# Patient Record
Sex: FEMALE | Race: BLACK OR AFRICAN AMERICAN | NOT HISPANIC OR LATINO | URBAN - METROPOLITAN AREA
[De-identification: names, ages, dates, MRNs, and addresses within clinical notes are randomized per-mention and may not be internally consistent; named-entity substitution may affect disease eponyms.]

---

## 2020-06-11 ENCOUNTER — EMERGENCY (EMERGENCY)
Facility: HOSPITAL | Age: 55
LOS: 1 days | Discharge: HOME | End: 2020-06-11
Attending: EMERGENCY MEDICINE | Admitting: EMERGENCY MEDICINE
Payer: MEDICARE

## 2020-06-11 VITALS
RESPIRATION RATE: 18 BRPM | SYSTOLIC BLOOD PRESSURE: 172 MMHG | DIASTOLIC BLOOD PRESSURE: 91 MMHG | OXYGEN SATURATION: 97 % | WEIGHT: 149.91 LBS | TEMPERATURE: 99 F | HEART RATE: 115 BPM

## 2020-06-11 VITALS
RESPIRATION RATE: 18 BRPM | DIASTOLIC BLOOD PRESSURE: 74 MMHG | HEART RATE: 86 BPM | OXYGEN SATURATION: 97 % | TEMPERATURE: 98 F | SYSTOLIC BLOOD PRESSURE: 190 MMHG

## 2020-06-11 LAB — SARS-COV-2 RNA SPEC QL NAA+PROBE: SIGNIFICANT CHANGE UP

## 2020-06-11 PROCEDURE — 87635 SARS-COV-2 COVID-19 AMP PRB: CPT

## 2020-06-11 PROCEDURE — 73630 X-RAY EXAM OF FOOT: CPT

## 2020-06-11 PROCEDURE — 99284 EMERGENCY DEPT VISIT MOD MDM: CPT

## 2020-06-11 PROCEDURE — 73630 X-RAY EXAM OF FOOT: CPT | Mod: 26,LT

## 2020-06-11 PROCEDURE — 99283 EMERGENCY DEPT VISIT LOW MDM: CPT

## 2020-06-11 RX ORDER — LIDOCAINE 4 G/100G
1 CREAM TOPICAL ONCE
Refills: 0 | Status: COMPLETED | OUTPATIENT
Start: 2020-06-11 | End: 2020-06-11

## 2020-06-11 RX ADMIN — LIDOCAINE 1 PATCH: 4 CREAM TOPICAL at 18:27

## 2020-06-11 NOTE — ED PROVIDER NOTE - NSFOLLOWUPINSTRUCTIONS_ED_ALL_ED_FT
You were evaluated in the ED for foot pain. You had an xray of the foot which revealed no acute injuries. You were offered a walker, transportation to a shelter, and shelter placement, all which you declined. You refused blood pressure treatment. You had a COVID test, negative for the infection. You were fed.     Please follow the below diet for high blood pressure.    DASH Eating Plan    WHAT YOU NEED TO KNOW:    The DASH (Dietary Approaches to Stop Hypertension) Eating Plan is designed to help prevent or lower high blood pressure. It can also help to lower LDL (bad) cholesterol and decrease your risk of heart disease. The plan is low in sodium, sugar, unhealthy fats, and total fat. It is high in potassium, calcium, magnesium, and fiber. These nutrients are added when you eat more fruits, vegetables, and whole grains.          DISCHARGE INSTRUCTIONS:    Your sodium limit each day: Your dietitian will tell you how much sodium is safe for you to have each day. People with high blood pressure should have no more than 1,500 to 2,300 mg of sodium in a day. A teaspoon (tsp) of salt has 2,300 mg of sodium. This may seem like a difficult goal, but small changes to the foods you eat can make a big difference. Your healthcare provider or dietitian can help you create a meal plan that follows your sodium limit.    How to limit sodium:     Read food labels. Food labels can help you choose foods that are low in sodium. The amount of sodium is listed in milligrams (mg). The % Daily Value (DV) column tells you how much of your daily needs are met by 1 serving of the food for each nutrient listed. Choose foods that have less than 5% of the DV of sodium. These foods are considered low in sodium. Foods that have 20% or more of the DV of sodium are considered high in sodium. Avoid foods that have more than 300 mg of sodium in each serving. Choose foods that say low-sodium, reduced-sodium, or no salt added on the food label.           Avoid salt. Do not salt food at the table, and add very little salt to foods during cooking. Use herbs and spices, such as onions, garlic, and salt-free seasonings to add flavor to foods. Try lemon or lime juice or vinegar to give foods a tart flavor. Use hot peppers or a small amount of hot pepper sauce to add a spicy flavor to foods.       Ask about salt substitutes. Ask your healthcare provider if you may use salt substitutes. Some salt substitutes have ingredients that can be harmful if you have certain health conditions.      Choose foods carefully at restaurants. Meals from restaurants, especially fast food restaurants, are often high in sodium. Some restaurants have nutrition information that tells you the amount of sodium in their foods. Ask to have your food prepared with less, or no salt.     What you need to know about fats:     Include healthy fats. Examples are unsaturated fats and omega-3 fatty acids. Unsaturated fats are found in soybean, canola, olive, or sunflower oil, and liquid and soft tub margarines. Omega-3 fatty acids are found in fatty fish, such as salmon, tuna, mackerel, and sardines. It is also found in flaxseed oil and ground flaxseed. Sources of Omega 3           Avoid unhealthy fats. Do not eat unhealthy fats, such as saturated fats and trans fats. Saturated fats are found in foods that contain fat from animals. Examples are fatty meats, whole milk, butter, cream, and other dairy foods. It is also found in shortening, stick margarine, palm oil, and coconut oil. Trans fats are found in fried foods, crackers, chips, and baked goods made with margarine or shortening.     Foods to include: With the DASH eating plan, you need to eat a certain number of servings from each food group. This will help you get enough of certain nutrients and limit others. The amount of servings you should eat depends on how many calories you need. Your dietitian can tell you how many calories you need. The number of servings listed next to the food groups below are for people who need about 2,000 calories each day.     Grains: 6 to 8 servings (3 of these servings should be whole-grain foods)  1 slice of whole-grain bread       1 ounce of dry cereal      ½ cup of cooked cereal, pasta, or brown rice      Vegetables and fruits: 4 to 5 servings of fruits and 4 to 5 servings of vegetables  1 medium fruit      ½ cup of frozen, canned (no added salt), or chopped fresh vegetables       ½ cup of fresh, frozen, dried, or canned fruit (canned in light syrup or fruit juice)      ½ cup of vegetable or fruit juice      Dairy: 2 to 3 servings  1 cup of nonfat (skim) or 1% milk      1½ ounces of fat-free or low-fat cheese      6 ounces of nonfat or low-fat yogurt      Lean meat, poultry, and fish: 6 ounces or less  Poultry (chicken, turkey) with no skin      Fish (especially fatty fish, such as salmon, fresh tuna, or mackerel)      Lean beef and pork (loin, round, extra lean hamburger)      Egg whites and egg substitutes      Nuts, seeds, and legumes: 4 to 5 servings each week  ½ cup of cooked beans and peas      1½ ounces of unsalted nuts      2 tablespoons of peanut butter or seeds      Sweets and added sugars: 5 or less each week  1 tablespoon of sugar, jelly, or jam      ½ cup of sorbet or gelatin      1 cup of lemonade      Fats: 2 to 3 servings each week  1 teaspoon of soft margarine or vegetable oil      1 tablespoon of mayonnaise      2 tablespoons of salad dressing    Foods to avoid:     Grains:   Baked goods, such as doughnuts, pastries, cookies, and biscuits (high in fat and sugar)      Mixes for cornbread and biscuits, packaged foods, such as bread stuffing, rice and pasta mixes, macaroni and cheese, and instant cereals (high in sodium)      Fruits and vegetables:   Regular, canned vegetables (high in sodium)      Sauerkraut, pickled vegetables, and other foods prepared in brine (high in sodium)      Fried vegetables or vegetables in butter or high-fat sauces      Fruit in cream or butter sauce (high in fat)      Dairy:   Whole milk, 2% milk, and cream (high in fat)      Regular cheese and processed cheese (high in fat and sodium)      Meats and protein foods:   Smoked or cured meat, such as corned beef, valdez, ham, hot dogs, and sausage (high in fat and sodium)      Canned beans and canned meats or spreads, such as potted meats, sardines, anchovies, and imitation seafood (high in sodium)      Deli or lunch meats, such as bologna, ham, turkey, and roast beef (high in sodium)      High-fat meat (T-bone steak, regular hamburger, and ribs)      Whole eggs and egg yolks (high in fat)      Other:   Seasonings made with salt, such as garlic salt, celery salt, onion salt, seasoned salt, meat tenderizers, and monosodium glutamate (MSG)      Miso soup and canned or dried soup mixes (high in sodium)      Regular soy sauce, barbecue sauce, teriyaki sauce, steak sauce, Worcestershire sauce, and most flavored vinegars (high in sodium)      Regular condiments, such as mustard, ketchup, and salad dressings (high in sodium)      Gravy and sauces, such as Luis Manuel or cheese sauces (high in sodium and fat)      Drinks high in sugar, such as soda or fruit drinks      Snack foods, such as salted chips, popcorn, pretzels, pork rinds, salted crackers, and salted nuts      Frozen foods, such as dinners, entrees, vegetables with sauces, and breaded meats (high in sodium)    Other guidelines to follow:     Maintain a healthy weight. Your risk for heart disease is higher if you are overweight. Your healthcare provider may suggest that you lose weight if you are overweight. You can lose weight by eating fewer calories and foods that have added sugars and fat. The DASH meal plan can help you do this. Decrease calories by eating smaller portions at each meal and fewer snacks. Ask your healthcare provider for more information about how to lose weight.       Exercise regularly. Regular exercise can help you reach or maintain a healthy weight. Regular exercise can also help decrease your blood pressure and improve your cholesterol levels. Get 30 minutes or more of moderate exercise each day of the week. To lose weight, get at least 60 minutes of exercise. Talk to your healthcare provider about the best exercise program for you.Walking for Exercise           Limit alcohol. Women should limit alcohol to 1 drink a day. Men should limit alcohol to 2 drinks a day. A drink of alcohol is 12 ounces of beer, 5 ounces of wine, or 1½ ounces of liquor.      Hypertension    Hypertension, commonly called high blood pressure, is when the force of blood pumping through your arteries is too strong. Hypertension forces your heart to work harder to pump blood. Your arteries may become narrow or stiff. Having untreated or uncontrolled hypertension for a long period of time can cause heart attack, stroke, kidney disease, and other problems. If started on a medication, take exactly as prescribed by your health care professional. Maintain a healthy lifestyle and follow up with your primary care physician.    SEEK IMMEDIATE MEDICAL CARE IF YOU HAVE ANY OF THE FOLLOWING SYMPTOMS: severe headache, confusion, chest pain, abdominal pain, vomiting, or shortness of breath.
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
## 2020-06-11 NOTE — ED PROVIDER NOTE - CLINICAL SUMMARY MEDICAL DECISION MAKING FREE TEXT BOX
Pt c/o atraumatic left pain, looking for food / drink / shelter placement. Poor historian w/ inconsistent story. Will attempt to get collateral info from [facility]. Feed / XR/ analgesia, SW for placement. Anticipate d/c

## 2020-06-11 NOTE — ED PROVIDER NOTE - PATIENT PORTAL LINK FT
You can access the FollowMyHealth Patient Portal offered by [facility] by registering at the following website: [email or web address]. By joining [facility]’s FollowMyHealth portal, you will also be able to view your health information using other applications (apps) compatible with our system.

## 2020-06-11 NOTE — ED PROVIDER NOTE - CARE PLAN
Principal Discharge DX:	Foot pain, left Principal Discharge DX:	Foot pain, left  Secondary Diagnosis:	Social problem  Secondary Diagnosis:	Hypertension, unspecified type

## 2020-06-11 NOTE — ED ADULT NURSE NOTE - OBJECTIVE STATEMENT
Pt presents reporting left foot pain. Pt reports distant hx of left foot fracture that never properly healed. PT reports she did excessive walking yesterday and now has pain and subjective edema to the lateral edge of her left foot. Pt currently homeless, requests assistance with shelter placement.

## 2020-06-11 NOTE — ED ADULT NURSE NOTE - CHPI ED NUR SYMPTOMS NEG
no fever/no abrasion/no deformity/no weakness/no tingling/no difficulty bearing weight/no numbness/no stiffness/no bruising

## 2020-06-11 NOTE — ED PROVIDER NOTE - PHYSICAL EXAMINATION
Limited PE performed in the setting of the COVID10 pandemic, in efforts to limit exposure and cross-contamination  Constitutional: Well appearing, well nourished, awake, alert, oriented to person, place, time/situation and in no apparent distress.  ENMT: Airway patent.   Eyes: Clear bilaterally  Cardiac: Normal rate, regular rhythm.   Respiratory: No increased WOB, tachypnea, hypoxia, or accessory mm use. Pt speaks in full sentences.   Gastrointestinal: Abd soft, NT, ND. No guarding, rebound, or rigidity. obese  Musculoskeletal: Range of motion is not limited. mild symmetric nonpitting b/l LE edema. no calf ttp. no apparent foot trauma. + mild ttp over the L 5th metatarsal. NVI b/l  Neuro: Alert and oriented x 3, face symmetric and speech fluent. Nml gross motor movement, grossly non focal   Skin: Skin normal color for race, warm, dry and intact. No evidence of rash.  Psych: Alert and oriented to person, place, time/situation. normal mood and affect. no apparent risk to self or others.
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
## 2020-06-15 DIAGNOSIS — Z91.013 ALLERGY TO SEAFOOD: ICD-10-CM

## 2020-06-15 DIAGNOSIS — M79.672 PAIN IN LEFT FOOT: ICD-10-CM

## 2020-06-15 DIAGNOSIS — I10 ESSENTIAL (PRIMARY) HYPERTENSION: ICD-10-CM

## 2020-06-15 DIAGNOSIS — R60.0 LOCALIZED EDEMA: ICD-10-CM

## 2020-06-15 DIAGNOSIS — Z91.018 ALLERGY TO OTHER FOODS: ICD-10-CM

## 2020-06-15 DIAGNOSIS — Z20.828 CONTACT WITH AND (SUSPECTED) EXPOSURE TO OTHER VIRAL COMMUNICABLE DISEASES: ICD-10-CM

## 2020-07-08 ENCOUNTER — EMERGENCY (EMERGENCY)
Facility: HOSPITAL | Age: 55
LOS: 1 days | Discharge: HOME | End: 2020-07-08
Attending: EMERGENCY MEDICINE | Admitting: EMERGENCY MEDICINE
Payer: MEDICARE

## 2020-07-08 VITALS
DIASTOLIC BLOOD PRESSURE: 97 MMHG | RESPIRATION RATE: 18 BRPM | SYSTOLIC BLOOD PRESSURE: 168 MMHG | HEIGHT: 66 IN | WEIGHT: 119.93 LBS | OXYGEN SATURATION: 99 % | TEMPERATURE: 98 F | HEART RATE: 81 BPM

## 2020-07-08 PROCEDURE — 99282 EMERGENCY DEPT VISIT SF MDM: CPT

## 2020-07-08 RX ORDER — BACITRACIN ZINC 500 UNIT/G
1 OINTMENT IN PACKET (EA) TOPICAL ONCE
Refills: 0 | Status: COMPLETED | OUTPATIENT
Start: 2020-07-08 | End: 2020-07-08

## 2020-07-08 RX ADMIN — Medication 1 APPLICATION(S): at 09:41
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
## 2020-07-08 NOTE — ED PROVIDER NOTE - NS_EDPROVIDERDISPOUSERTYPE_ED_A_ED
I have personally evaluated and examined the patient. The Attending was available to me as a supervising provider if needed. Scribe Attestation (For Scribes USE Only)... Attending Attestation (For Attendings USE Only).../Scribe Attestation (For Scribes USE Only)...
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
## 2020-07-08 NOTE — ED ADULT NURSE NOTE - CHPI ED NUR SYMPTOMS NEG
no bleeding/no purulent drainage/no redness/no inflammation/no fever/no bleeding at site/no drainage/no rectal pain/no chills
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
## 2020-07-08 NOTE — ED PROVIDER NOTE - NSFOLLOWUPINSTRUCTIONS_ED_ALL_ED_FT
Keep your wound dry, clean, apply topical bacitracin as recommended and f/u for re-check in a few days      Blisters, Adult     A blister is a raised bubble of skin filled with liquid. Blisters often develop in an area of the skin that repeatedly rubs or presses against another surface (friction blister). Friction blisters can occur on any part of the body, but they usually develop on the hands or feet. Long-term pressure on the same area of the skin can also lead to areas of hardened skin (calluses).  What are the causes?  A blister can be caused by:  An injury.A burn.An allergic reaction.An infection.Exposure to irritating chemicals.Friction, especially in an area with a lot of heat and moisture.Friction blisters often result from:  Sports.Repetitive activities.Using tools and doing other activities without wearing gloves.Shoes that are too tight or too loose.What are the signs or symptoms?  A blister is often round and looks like a bump. It may:  Itch.Be painful to the touch.Before a blister forms, the skin may:  Become red.Feel warm.Itch.Be painful to the touch.How is this diagnosed?  A blister is diagnosed with a physical exam.  How is this treated?  Treatment usually involves protecting the area where the blister has formed until the skin has healed. Other treatments may include:  A bandage (dressing) to cover the blister.Extra padding around and over the blister, so that it does not rub on anything.Antibiotic ointment.Most blisters break open, dry up, and go away on their own within 1–2 weeks. Blisters that are very painful may be drained before they break open on their own. If the blister is large or painful, it can be drained by:  Sterilizing a small needle with rubbing alcohol.  Washing your hands with soap and water.  Inserting the needle in the edge of the blister to make a small hole. Some fluid will drain out of the hole. Let the top or roof of the blister stay in place. This helps the skin heal.  Washing the blister with mild soap and water.  Covering the blister with antibiotic ointment, if prescribed by your health care provider, and a dressing.  Some blisters may need to be drained by a health care provider.  Follow these instructions at home:  Protect the area where the blister has formed as told by your health care provider.Keep your blister clean and dry. This helps to prevent infection.Do not pop your blister. This can cause infection.If you were prescribed an antibiotic, use it as told by your health care provider. Do not stop using the antibiotic even if your condition improves.Wear different shoes until the blister heals.Avoid the activity that caused the blister until your blister heals.Check your blister every day for signs of infection. Check for:  More redness, swelling, or pain.More fluid or blood.Warmth.Pus or a bad smell.The blister getting better and then getting worse.How is this prevented?  Taking these steps can help to prevent blisters that are caused by friction. Make sure you:  Wear comfortable shoes that fit well.Always wear socks with shoes.Wear extra socks or use tape, bandages, or pads over blister-prone areas as needed. You may also apply petroleum jelly under bandages in blister-prone areas.Wear protective gear, such as gloves, when participating in sports or activities that can cause blisters.Wear loose-fitting, moisture-wicking clothes when participating in sports or activities.Use powders as needed to keep your feet dry.Contact a health care provider if:  You have more redness, swelling, or pain around your blister.You have more fluid or blood coming from your blister.Your blister feels warm to the touch.You have pus or a bad smell coming from your blister.You have a fever or chills.Your blister gets better and then it gets worse.This information is not intended to replace advice given to you by your health care provider. Make sure you discuss any questions you have with your health care provider.

## 2020-07-08 NOTE — ED PROVIDER NOTE - SKIN, MLM
Skin normal color for race, warm, dry and intact. [id number] circular wound. Not cellulitic appearing. No active drainage no erythema. Skin normal color for race, warm, dry and intact. [id number] circular superficial wound, Not cellulitic appearing. No active drainage no erythema. pink granulation tissue present

## 2020-07-08 NOTE — ED PROVIDER NOTE - PATIENT PORTAL LINK FT
You can access the FollowMyHealth Patient Portal offered by [facility] by registering at the following website: [email or web address]. By joining [employer] [employer]’s FollowMyHealth portal, you will also be able to view your health information using other applications (apps) compatible with our system.

## 2020-07-08 NOTE — ED PROVIDER NOTE - MUSCULOSKELETAL, MLM
Spine appears normal, range of motion is not limited, no muscle or joint tenderness Spine and all extremities grossly appears normal, range of motion is not limited, no muscle or joint tenderness

## 2020-07-08 NOTE — ED ADULT NURSE NOTE - NSIMPLEMENTINTERV_GEN_ALL_ED
Implemented All Fall Risk Interventions:  [place] to call system. Call bell, personal items and telephone within reach. Instruct patient to call for assistance. Room bathroom lighting operational. Non-slip footwear when patient is off stretcher. Physically safe environment: no spills, clutter or unnecessary equipment. Stretcher in lowest position, wheels locked, appropriate side rails in place. Provide visual cue, wrist band, yellow gown, etc. Monitor gait and stability. Monitor for mental status changes and reorient to person, place, and time. Review medications for side effects contributing to fall risk. Reinforce activity limits and safety measures with patient and family.

## 2020-07-12 DIAGNOSIS — Z48.00 ENCOUNTER FOR CHANGE OR REMOVAL OF NONSURGICAL WOUND DRESSING: ICD-10-CM

## 2020-07-12 DIAGNOSIS — Z91.018 ALLERGY TO OTHER FOODS: ICD-10-CM

## 2020-07-12 DIAGNOSIS — Z91.013 ALLERGY TO SEAFOOD: ICD-10-CM

## 2023-03-23 ENCOUNTER — EMERGENCY (EMERGENCY)
Facility: HOSPITAL | Age: 58
LOS: 1 days | Discharge: HOME | End: 2023-03-23
Attending: EMERGENCY MEDICINE | Admitting: EMERGENCY MEDICINE
Payer: SELF-PAY

## 2023-03-23 VITALS
OXYGEN SATURATION: 98 % | DIASTOLIC BLOOD PRESSURE: 89 MMHG | HEART RATE: 94 BPM | TEMPERATURE: 99 F | RESPIRATION RATE: 18 BRPM | SYSTOLIC BLOOD PRESSURE: 149 MMHG

## 2023-03-23 VITALS
HEART RATE: 97 BPM | TEMPERATURE: 99 F | SYSTOLIC BLOOD PRESSURE: 169 MMHG | DIASTOLIC BLOOD PRESSURE: 95 MMHG | OXYGEN SATURATION: 95 % | RESPIRATION RATE: 18 BRPM

## 2023-03-23 PROBLEM — Z78.9 OTHER SPECIFIED HEALTH STATUS: Chronic | Status: ACTIVE | Noted: 2020-07-09

## 2023-03-23 LAB
ALBUMIN SERPL ELPH-MCNC: 3.7 G/DL — SIGNIFICANT CHANGE UP (ref 3.3–5)
ALP SERPL-CCNC: 115 U/L — SIGNIFICANT CHANGE UP (ref 40–120)
ALT FLD-CCNC: 23 U/L — SIGNIFICANT CHANGE UP (ref 10–45)
ANION GAP SERPL CALC-SCNC: 10 MMOL/L — SIGNIFICANT CHANGE UP (ref 5–17)
APTT BLD: 30.6 SEC — SIGNIFICANT CHANGE UP (ref 27.5–35.5)
AST SERPL-CCNC: 17 U/L — SIGNIFICANT CHANGE UP (ref 10–40)
BASOPHILS # BLD AUTO: 0.03 K/UL — SIGNIFICANT CHANGE UP (ref 0–0.2)
BASOPHILS NFR BLD AUTO: 0.4 % — SIGNIFICANT CHANGE UP (ref 0–2)
BILIRUB SERPL-MCNC: 0.3 MG/DL — SIGNIFICANT CHANGE UP (ref 0.2–1.2)
BUN SERPL-MCNC: 20 MG/DL — SIGNIFICANT CHANGE UP (ref 7–23)
CALCIUM SERPL-MCNC: 9.9 MG/DL — SIGNIFICANT CHANGE UP (ref 8.4–10.5)
CHLORIDE SERPL-SCNC: 106 MMOL/L — SIGNIFICANT CHANGE UP (ref 96–108)
CK MB CFR SERPL CALC: 1.8 NG/ML — SIGNIFICANT CHANGE UP (ref 0–6.7)
CK SERPL-CCNC: 94 U/L — SIGNIFICANT CHANGE UP (ref 25–170)
CO2 SERPL-SCNC: 26 MMOL/L — SIGNIFICANT CHANGE UP (ref 22–31)
CREAT SERPL-MCNC: 1.06 MG/DL — SIGNIFICANT CHANGE UP (ref 0.5–1.3)
EGFR: 62 ML/MIN/1.73M2 — SIGNIFICANT CHANGE UP
EOSINOPHIL # BLD AUTO: 0.08 K/UL — SIGNIFICANT CHANGE UP (ref 0–0.5)
EOSINOPHIL NFR BLD AUTO: 1.1 % — SIGNIFICANT CHANGE UP (ref 0–6)
GLUCOSE SERPL-MCNC: 111 MG/DL — HIGH (ref 70–99)
HCT VFR BLD CALC: 39.7 % — SIGNIFICANT CHANGE UP (ref 34.5–45)
HGB BLD-MCNC: 11.9 G/DL — SIGNIFICANT CHANGE UP (ref 11.5–15.5)
IMM GRANULOCYTES NFR BLD AUTO: 0.4 % — SIGNIFICANT CHANGE UP (ref 0–0.9)
INR BLD: 1.02 — SIGNIFICANT CHANGE UP (ref 0.88–1.16)
LYMPHOCYTES # BLD AUTO: 1.75 K/UL — SIGNIFICANT CHANGE UP (ref 1–3.3)
LYMPHOCYTES # BLD AUTO: 24.2 % — SIGNIFICANT CHANGE UP (ref 13–44)
MCHC RBC-ENTMCNC: 27.1 PG — SIGNIFICANT CHANGE UP (ref 27–34)
MCHC RBC-ENTMCNC: 30 GM/DL — LOW (ref 32–36)
MCV RBC AUTO: 90.4 FL — SIGNIFICANT CHANGE UP (ref 80–100)
MONOCYTES # BLD AUTO: 0.53 K/UL — SIGNIFICANT CHANGE UP (ref 0–0.9)
MONOCYTES NFR BLD AUTO: 7.3 % — SIGNIFICANT CHANGE UP (ref 2–14)
NEUTROPHILS # BLD AUTO: 4.81 K/UL — SIGNIFICANT CHANGE UP (ref 1.8–7.4)
NEUTROPHILS NFR BLD AUTO: 66.6 % — SIGNIFICANT CHANGE UP (ref 43–77)
NRBC # BLD: 0 /100 WBCS — SIGNIFICANT CHANGE UP (ref 0–0)
PLATELET # BLD AUTO: 278 K/UL — SIGNIFICANT CHANGE UP (ref 150–400)
POTASSIUM SERPL-MCNC: 4.3 MMOL/L — SIGNIFICANT CHANGE UP (ref 3.5–5.3)
POTASSIUM SERPL-SCNC: 4.3 MMOL/L — SIGNIFICANT CHANGE UP (ref 3.5–5.3)
PROT SERPL-MCNC: 7.3 G/DL — SIGNIFICANT CHANGE UP (ref 6–8.3)
PROTHROM AB SERPL-ACNC: 12.2 SEC — SIGNIFICANT CHANGE UP (ref 10.5–13.4)
RBC # BLD: 4.39 M/UL — SIGNIFICANT CHANGE UP (ref 3.8–5.2)
RBC # FLD: 14.1 % — SIGNIFICANT CHANGE UP (ref 10.3–14.5)
SARS-COV-2 RNA SPEC QL NAA+PROBE: NEGATIVE — SIGNIFICANT CHANGE UP
SODIUM SERPL-SCNC: 142 MMOL/L — SIGNIFICANT CHANGE UP (ref 135–145)
WBC # BLD: 7.23 K/UL — SIGNIFICANT CHANGE UP (ref 3.8–10.5)
WBC # FLD AUTO: 7.23 K/UL — SIGNIFICANT CHANGE UP (ref 3.8–10.5)

## 2023-03-23 PROCEDURE — 82550 ASSAY OF CK (CPK): CPT

## 2023-03-23 PROCEDURE — 99283 EMERGENCY DEPT VISIT LOW MDM: CPT | Mod: 25

## 2023-03-23 PROCEDURE — 82553 CREATINE MB FRACTION: CPT

## 2023-03-23 PROCEDURE — 85610 PROTHROMBIN TIME: CPT

## 2023-03-23 PROCEDURE — 73560 X-RAY EXAM OF KNEE 1 OR 2: CPT

## 2023-03-23 PROCEDURE — 36415 COLL VENOUS BLD VENIPUNCTURE: CPT

## 2023-03-23 PROCEDURE — 80053 COMPREHEN METABOLIC PANEL: CPT

## 2023-03-23 PROCEDURE — 85730 THROMBOPLASTIN TIME PARTIAL: CPT

## 2023-03-23 PROCEDURE — 85025 COMPLETE CBC W/AUTO DIFF WBC: CPT

## 2023-03-23 PROCEDURE — 87635 SARS-COV-2 COVID-19 AMP PRB: CPT

## 2023-03-23 PROCEDURE — 73560 X-RAY EXAM OF KNEE 1 OR 2: CPT | Mod: 26,RT

## 2023-03-23 PROCEDURE — 99285 EMERGENCY DEPT VISIT HI MDM: CPT

## 2023-03-23 PROCEDURE — 87040 BLOOD CULTURE FOR BACTERIA: CPT

## 2023-03-23 RX ORDER — CEPHALEXIN 500 MG
1 CAPSULE ORAL
Qty: 28 | Refills: 0
Start: 2023-03-23 | End: 2023-03-29

## 2023-03-23 RX ORDER — CEPHALEXIN 500 MG
500 CAPSULE ORAL ONCE
Refills: 0 | Status: COMPLETED | OUTPATIENT
Start: 2023-03-23 | End: 2023-03-23

## 2023-03-23 RX ORDER — ACETAMINOPHEN 500 MG
650 TABLET ORAL ONCE
Refills: 0 | Status: COMPLETED | OUTPATIENT
Start: 2023-03-23 | End: 2023-03-23

## 2023-03-23 RX ADMIN — Medication 1 TABLET(S): at 18:40

## 2023-03-23 RX ADMIN — Medication 500 MILLIGRAM(S): at 18:40

## 2023-03-23 NOTE — ED ADULT NURSE REASSESSMENT NOTE - NS ED NURSE REASSESS COMMENT FT1
Pt refusing to leave d/t difficulty walking w leg wounds. Pt states "I was supposed to go to a shelter but I got ill and I can't so now I am holding out on the street." Pt requesting transport [address], pt educated on transport regulations. Pt insurance checked w ED  and no transportation service covered. Pt ambulatory w cane.   Pt given sandwich and metrocard.

## 2023-03-23 NOTE — ED PROVIDER NOTE - PROGRESS NOTE DETAILS
[place]: XR wnl. Labs grossly wnl. Pt remains very well appearing. Steady gait w/ cane.   Will tx for cellulitis. Discussed importance of outpt follow up and return precautions. Clinically no indication for further emergent ED workup or hospitalization at this time. Stable for dc, outpt f/u.

## 2023-03-23 NOTE — ED PROVIDER NOTE - NSFOLLOWUPINSTRUCTIONS_ED_ALL_ED_FT
Can take tylenol 650mg every 6hrs as needed for pain.    Take antibiotics as prescribed.     Take daily pictures of affected area.    Stay well hydrated.    Return for fevers, persistent vomit, uncontrolled pain, worsening breathing, worsening lightheaded, spreading redness, worsening swelling.    Follow up with primary doctor within 1-2 days.     Keep wounds clean!     Cellulitis    Cellulitis is a skin infection caused by bacteria. This condition occurs most often in the arms and lower legs but can occur anywhere over the body. Symptoms include redness, swelling, warm skin, tenderness, and chills/fever. If you were prescribed an antibiotic medicine, take it as told by your health care provider. Do not stop taking the antibiotic even if you start to feel better.    SEEK IMMEDIATE MEDICAL CARE IF YOU HAVE ANY OF THE FOLLOWING SYMPTOMS: worsening fever, red streaks coming from affected area, vomiting or diarrhea, or dizziness/lightheadedness.

## 2023-03-23 NOTE — ED ADULT NURSE NOTE - OBJECTIVE STATEMENT
Pt arrived to ED c/o of wound check. Pt states "I had a bad fall 3 weeks ago and since then I have had blood clots in my left foot and blisters all over both legs and swelling for a few days." Pt denies numbness, tingling and loss of sensation in the LE. Pt denies seeking medical attention after the fall. Pt denies cp and sob currently but reports sob on exertion. Pt reports she has been having issues with ambulating lately, states "It feels like I cant take the next step sometime".

## 2023-03-23 NOTE — ED PROVIDER NOTE - PHYSICAL EXAMINATION
~2cm blister to L posterior heel, ~1cm purplish non-blanching blister to distal aspect of L first toe  b/l LE non-pitting edema.   More 1-2cm blisters (some debrided) on medial aspect of RLE from medial aspect of mid thigh to medial aspect of knee region. There is faint surrounding blanching erythema and warmth.   No crepitus, firmness, induration, fluctuance.   normal equal distal pulses

## 2023-03-23 NOTE — ED ADULT TRIAGE NOTE - CHIEF COMPLAINT QUOTE
Pt presents to ED c/o wound check. states "I have big red blood clots sticking out of my skin on my L foot". c/o bilateral leg pain since a fall 3 weeks ago.

## 2023-03-23 NOTE — ED PROVIDER NOTE - PATIENT PORTAL LINK FT
You can access the FollowMyHealth Patient Portal offered by [facility] by registering at the following website: [email or web address]. By joining [facility]’s FollowMyHealth portal, you will also be able to view your health information using other applications (apps) compatible with our system.

## 2023-03-23 NOTE — ED ADULT NURSE REASSESSMENT NOTE - NS ED NURSE REASSESS COMMENT FT1
Pt given socks and post op shoes. Pt given homeless shelter list and list reviewed w pt. Pt agreeable to leave. pt ambulatory w steady gait w cane.

## 2023-03-23 NOTE — ED PROVIDER NOTE - OBJECTIVE STATEMENT
57F PMH MO, HTN, DVT (2011, no longer on AC), TBI and spinal injury, PSHx b/l THR p/w pain/blisters. Pt notes pain/blisters to L heel/toes for 3-4 days - states she thinks they are blood clots. Also c/o blisters/pain to R medial knee region x 3-4 days. No other systemic symptoms.   Undomiciled, ambulates w/ cane at baseline.  Denies HA, f/c, SOB/CP, NVD, focal weakness/numbness, abd pain, urinary complaints, other joint pain. Cannot recall specific precipitating trauma.

## 2023-03-23 NOTE — ED PROVIDER NOTE - CLINICAL SUMMARY MEDICAL DECISION MAKING FREE TEXT BOX
57F PMH MO, HTN, DVT (2011, no longer on AC), TBI and spinal injury, PSHx b/l THR p/w pain/blisters. Pt notes pain/blisters to L heel/toes for 3-4 days - states she thinks they are blood clots. Also c/o blisters/pain to R medial knee region x 3-4 days. No other systemic symptoms.   Hypertensive, other vitals wnl. Exam as above.  ddx: Unclear etiology. Low suspicion for significant vasculitis, very low suspicion nec fasc. Possible cellulitis. Possible friction injury. Clinically no fx, symptoms not c/w DVT.   Labs, XR, symptom control, reassess.

## 2023-03-27 DIAGNOSIS — Z91.018 ALLERGY TO OTHER FOODS: ICD-10-CM

## 2023-03-27 DIAGNOSIS — Z20.822 CONTACT WITH AND (SUSPECTED) EXPOSURE TO COVID-19: ICD-10-CM

## 2023-03-27 DIAGNOSIS — Z86.718 PERSONAL HISTORY OF OTHER VENOUS THROMBOSIS AND EMBOLISM: ICD-10-CM

## 2023-03-27 DIAGNOSIS — E66.9 OBESITY, UNSPECIFIED: ICD-10-CM

## 2023-03-27 DIAGNOSIS — R21 RASH AND OTHER NONSPECIFIC SKIN ERUPTION: ICD-10-CM

## 2023-03-27 DIAGNOSIS — Z91.013 ALLERGY TO SEAFOOD: ICD-10-CM

## 2023-03-27 DIAGNOSIS — Z59.00 HOMELESSNESS UNSPECIFIED: ICD-10-CM

## 2023-03-27 DIAGNOSIS — L03.115 CELLULITIS OF RIGHT LOWER LIMB: ICD-10-CM

## 2023-03-27 DIAGNOSIS — I10 ESSENTIAL (PRIMARY) HYPERTENSION: ICD-10-CM

## 2023-03-27 SDOH — ECONOMIC STABILITY - HOUSING INSECURITY: HOMELESSNESS UNSPECIFIED: Z59.00

## 2023-03-28 LAB
CULTURE RESULTS: SIGNIFICANT CHANGE UP
CULTURE RESULTS: SIGNIFICANT CHANGE UP
SPECIMEN SOURCE: SIGNIFICANT CHANGE UP
SPECIMEN SOURCE: SIGNIFICANT CHANGE UP

## 2024-04-09 NOTE — ED ADULT NURSE NOTE - LOCATION
-- DO NOT REPLY / DO NOT REPLY ALL --  -- Message is from [facility] [employer] ([facility]) --    General Patient Message: Patient states she is having ear issues, very sensitive hearing sounding like whishing water and other noises patient is having trouble concentrating. Patient states she has a lot of wax build up morning & eveing daily . Danitza says when she hears someone speaking she is having a hard time comprehending and returning what she wants to respond.    Please advise        Alternative phone number: [phone]     Can a detailed message be left? Yes    Message Turnaround:                left foot

## 2025-01-14 NOTE — ED ADULT NURSE NOTE - NSIMPLEMENTINTERV_GEN_ALL_ED
[id number]
Implemented All Fall Risk Interventions:  [place] to call system. Call bell, personal items and telephone within reach. Instruct patient to call for assistance. Room bathroom lighting operational. Non-slip footwear when patient is off stretcher. Physically safe environment: no spills, clutter or unnecessary equipment. Stretcher in lowest position, wheels locked, appropriate side rails in place. Provide visual cue, wrist band, yellow gown, etc. Monitor gait and stability. Monitor for mental status changes and reorient to person, place, and time. Review medications for side effects contributing to fall risk. Reinforce activity limits and safety measures with patient and family.